# Patient Record
Sex: MALE | Race: WHITE
[De-identification: names, ages, dates, MRNs, and addresses within clinical notes are randomized per-mention and may not be internally consistent; named-entity substitution may affect disease eponyms.]

---

## 2021-07-02 ENCOUNTER — HOSPITAL ENCOUNTER (OUTPATIENT)
Dept: HOSPITAL 75 - RAD | Age: 46
End: 2021-07-02
Attending: NURSE PRACTITIONER
Payer: COMMERCIAL

## 2021-07-02 DIAGNOSIS — R74.8: ICD-10-CM

## 2021-07-02 DIAGNOSIS — K76.0: Primary | ICD-10-CM

## 2021-07-02 PROCEDURE — 76705 ECHO EXAM OF ABDOMEN: CPT

## 2021-07-02 NOTE — DIAGNOSTIC IMAGING REPORT
PROCEDURE: US Hepatic (Liver).



TECHNIQUE: Multiple real-time grayscale images were obtained over

the right upper quadrant in various projections.



INDICATION: Elevated liver enzymes.



FINDINGS: Liver is normal in size 15.6 cm. There is some

increased echogenicity throughout the liver consistent with

hepatic steatosis. No discrete liver mass is detected. Portal

vein is patent and shows normal direction of flow. Gallbladder is

without stones or sludge. There is no wall thickening or biliary

ductal dilatation. Visualized pancreas unremarkable. Aorta is

nonaneurysmal. IVC is patent. Right kidney is without calculi or

hydronephrosis. There is no ascites.



IMPRESSION:

1. Hepatic steatosis.

2. No evidence of cholelithiasis or acute cholecystitis.



Dictated by: 



  Dictated on workstation # [id number]